# Patient Record
Sex: MALE | Race: WHITE
[De-identification: names, ages, dates, MRNs, and addresses within clinical notes are randomized per-mention and may not be internally consistent; named-entity substitution may affect disease eponyms.]

---

## 2018-02-22 ENCOUNTER — HOSPITAL ENCOUNTER (EMERGENCY)
Dept: HOSPITAL 17 - NEPK | Age: 23
Discharge: HOME | End: 2018-02-22
Payer: COMMERCIAL

## 2018-02-22 VITALS
RESPIRATION RATE: 14 BRPM | OXYGEN SATURATION: 97 % | TEMPERATURE: 97.9 F | DIASTOLIC BLOOD PRESSURE: 89 MMHG | SYSTOLIC BLOOD PRESSURE: 150 MMHG | HEART RATE: 104 BPM

## 2018-02-22 VITALS — WEIGHT: 264.55 LBS | BODY MASS INDEX: 35.83 KG/M2 | HEIGHT: 72 IN
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
## 2018-02-22 DIAGNOSIS — S40.812A: Primary | ICD-10-CM

## 2018-02-22 DIAGNOSIS — Z23: ICD-10-CM
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
## 2018-02-22 PROCEDURE — 90471 IMMUNIZATION ADMIN: CPT

## 2018-02-22 PROCEDURE — 73030 X-RAY EXAM OF SHOULDER: CPT

## 2018-02-22 PROCEDURE — 90714 TD VACC NO PRESV 7 YRS+ IM: CPT

## 2018-02-22 PROCEDURE — 73090 X-RAY EXAM OF FOREARM: CPT
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
